# Patient Record
(demographics unavailable — no encounter records)

---

## 2025-06-23 NOTE — HISTORY OF PRESENT ILLNESS
[FreeTextEntry1] : annual wellness visit  [de-identified] : 72-year-old male with insulin dependent T2DM (A1c 9.6 on 02/2024), HLD and HTN presenting for annual visit. Patient reports overall doing well. He has noticed fungus under his finger on his right hand and would like topical anti-fungal cream. When discussing his medications, he has been taking his BP meds 2-3x a week instead of everyday. He takes his glucose daily with sugars ranging from 105-150 every day. He has been taking insulin lantus 10units at night daily. He lives at home with his wife.

## 2025-06-23 NOTE — HEALTH RISK ASSESSMENT
[Little interest or pleasure doing things] : 1) Little interest or pleasure doing things [Feeling down, depressed, or hopeless] : 2) Feeling down, depressed, or hopeless [0] : 2) Feeling down, depressed, or hopeless: Not at all (0) [PHQ-9 Negative - No further assessment needed] : PHQ-9 Negative - No further assessment needed [Former] : Former [No] : No [No falls in past year] : Patient reported no falls in the past year [PHQ-2 Negative - No further assessment needed] : PHQ-2 Negative - No further assessment needed [Fully functional (bathing, dressing, toileting, transferring, walking, feeding)] : Fully functional (bathing, dressing, toileting, transferring, walking, feeding) [[facility]] : 10 [[id number]] : 0

## 2025-06-23 NOTE — HEALTH RISK ASSESSMENT
[Little interest or pleasure doing things] : 1) Little interest or pleasure doing things [Feeling down, depressed, or hopeless] : 2) Feeling down, depressed, or hopeless [0] : 2) Feeling down, depressed, or hopeless: Not at all (0) [PHQ-9 Negative - No further assessment needed] : PHQ-9 Negative - No further assessment needed [Former] : Former [No] : No [No falls in past year] : Patient reported no falls in the past year [PHQ-2 Negative - No further assessment needed] : PHQ-2 Negative - No further assessment needed [Fully functional (bathing, dressing, toileting, transferring, walking, feeding)] : Fully functional (bathing, dressing, toileting, transferring, walking, feeding) [[facility]] : 10 [[id number]] : 0

## 2025-06-23 NOTE — HEALTH RISK ASSESSMENT
[Little interest or pleasure doing things] : 1) Little interest or pleasure doing things [Feeling down, depressed, or hopeless] : 2) Feeling down, depressed, or hopeless [0] : 2) Feeling down, depressed, or hopeless: Not at all (0) [PHQ-9 Negative - No further assessment needed] : PHQ-9 Negative - No further assessment needed [Former] : Former [No] : No [No falls in past year] : Patient reported no falls in the past year [PHQ-2 Negative - No further assessment needed] : PHQ-2 Negative - No further assessment needed [Fully functional (bathing, dressing, toileting, transferring, walking, feeding)] : Fully functional (bathing, dressing, toileting, transferring, walking, feeding) [[facility]] : 10 [[id number]] : 0

## 2025-06-23 NOTE — ASSESSMENT
[Vaccines Reviewed] : Immunizations reviewed today. Please see immunization details in the vaccine log within the immunization flowsheet.  [FreeTextEntry1] : 72-year-old male with insulin dependent T2DM (A1c 9.6 on 02/2024), HLD and HTN presenting for annual visit.

## 2025-06-23 NOTE — HISTORY OF PRESENT ILLNESS
[FreeTextEntry1] : annual wellness visit  [de-identified] : 72-year-old male with insulin dependent T2DM (A1c 9.6 on 02/2024), HLD and HTN presenting for annual visit. Patient reports overall doing well. He has noticed fungus under his finger on his right hand and would like topical anti-fungal cream. When discussing his medications, he has been taking his BP meds 2-3x a week instead of everyday. He takes his glucose daily with sugars ranging from 105-150 every day. He has been taking insulin lantus 10units at night daily. He lives at home with his wife.

## 2025-06-23 NOTE — PHYSICAL EXAM
[___/1] : [unfilled]/1   [___/3] : [unfilled]/3 [___/5] : [unfilled]/5 [___/4] : [unfilled]/4 [___/2] : [unfilled]/2 [___/8] : [unfilled]/8 [No Acute Distress] : no acute distress [Well-Appearing] : well-appearing [Normal Sclera/Conjunctiva] : normal sclera/conjunctiva [No Respiratory Distress] : no respiratory distress  [No Accessory Muscle Use] : no accessory muscle use [Clear to Auscultation] : lungs were clear to auscultation bilaterally [Normal Rate] : normal rate  [Regular Rhythm] : with a regular rhythm [Normal S1, S2] : normal S1 and S2 [No Murmur] : no murmur heard [Normal Affect] : the affect was normal [Alert and Oriented x3] : oriented to person, place, and time [de-identified] : +right hand pointer finger with green-brown discoloration under fingernail; [TextBox_2] : yes [TextBox_4] : bachelor's degree [SlumsTotal] : 27

## 2025-06-23 NOTE — PHYSICAL EXAM
[___/1] : [unfilled]/1   [___/3] : [unfilled]/3 [___/5] : [unfilled]/5 [___/4] : [unfilled]/4 [___/2] : [unfilled]/2 [___/8] : [unfilled]/8 [No Acute Distress] : no acute distress [Well-Appearing] : well-appearing [Normal Sclera/Conjunctiva] : normal sclera/conjunctiva [No Respiratory Distress] : no respiratory distress  [No Accessory Muscle Use] : no accessory muscle use [Clear to Auscultation] : lungs were clear to auscultation bilaterally [Normal Rate] : normal rate  [Regular Rhythm] : with a regular rhythm [Normal S1, S2] : normal S1 and S2 [No Murmur] : no murmur heard [Normal Affect] : the affect was normal [Alert and Oriented x3] : oriented to person, place, and time [de-identified] : +right hand pointer finger with green-brown discoloration under fingernail; [TextBox_2] : yes [TextBox_4] : bachelor's degree [SlumsTotal] : 27

## 2025-06-23 NOTE — PHYSICAL EXAM
[___/1] : [unfilled]/1   [___/3] : [unfilled]/3 [___/5] : [unfilled]/5 [___/4] : [unfilled]/4 [___/2] : [unfilled]/2 [___/8] : [unfilled]/8 [No Acute Distress] : no acute distress [Well-Appearing] : well-appearing [Normal Sclera/Conjunctiva] : normal sclera/conjunctiva [No Respiratory Distress] : no respiratory distress  [No Accessory Muscle Use] : no accessory muscle use [Clear to Auscultation] : lungs were clear to auscultation bilaterally [Normal Rate] : normal rate  [Regular Rhythm] : with a regular rhythm [Normal S1, S2] : normal S1 and S2 [No Murmur] : no murmur heard [Normal Affect] : the affect was normal [Alert and Oriented x3] : oriented to person, place, and time [de-identified] : +right hand pointer finger with green-brown discoloration under fingernail; [TextBox_2] : yes [TextBox_4] : bachelor's degree [SlumsTotal] : 27

## 2025-06-23 NOTE — HISTORY OF PRESENT ILLNESS
[FreeTextEntry1] : annual wellness visit  [de-identified] : 72-year-old male with insulin dependent T2DM (A1c 9.6 on 02/2024), HLD and HTN presenting for annual visit. Patient reports overall doing well. He has noticed fungus under his finger on his right hand and would like topical anti-fungal cream. When discussing his medications, he has been taking his BP meds 2-3x a week instead of everyday. He takes his glucose daily with sugars ranging from 105-150 every day. He has been taking insulin lantus 10units at night daily. He lives at home with his wife.

## 2025-06-23 NOTE — END OF VISIT
[] : Resident [FreeTextEntry3] : #CPE  #HTN- taking meds 2-3 times a week- needs to take regullary daily and come back to see us in a week to reassess  #weight loss   #h/o low b12- recheck  #focal intestinal metaplasia - f/u gi to evaluate now for focal intestinal metaplasia  #poorly controlled diabetes- recheck labs  #lung nodule- over due for ct chest- 1.9cm lesion in jan 2024- get ct chest now  [Time Spent: ___ minutes] : I have spent [unfilled] minutes of time on the encounter which excludes teaching and separately reported services.

## 2025-06-24 NOTE — PLAN
[FreeTextEntry1] : RTC in 1 week to f/u on medications he is taking. patient to bring in all medications he is taking including new prescriptions.

## 2025-06-24 NOTE — END OF VISIT
[] : Resident [FreeTextEntry3] : Patient here for follow-up of hypertension, diabetes and hypercholesterolemia.  Blood pressure is improved and close to goal.  Will continue current medication.  Diabetes is very much out of control with a hemoglobin of 14%.  Patient states his fasting blood sugar is between 100 and 130 each morning on 10 units of glargine insulin at night.  Will therefore not increase glargine but add semaglutide.  Will also prescribe a continuous glucose monitor.  Once he starts Ozempic, of asked him to discontinue the glimepiride.  The combination of insulin and sulfonylurea can lead to hypoglycemia.  In terms of his cholesterol, there is some concern he may not be taking his medications correctly.  We will ask him to return to the office in 1 week with his medications for medication review and education on the use of the continuous glucose monitor.

## 2025-06-24 NOTE — HISTORY OF PRESENT ILLNESS
[FreeTextEntry1] : Follow up on BP [de-identified] : 72-year-old male with insulin dependent T2DM (A1c 9.6 on 02/2024), HLD and HTN presenting for follow-up. Patient reports compliance with medications since last visit on 6/18. He reports taking his BP meds and DM meds daily. He has not gone to the pharmacy to  the new prescriptions sent last visit. When he checks his sugar in the morning he says it ranges from 110-150. He has no acute complaints.

## 2025-06-24 NOTE — HEALTH RISK ASSESSMENT
[Little interest or pleasure doing things] : 1) Little interest or pleasure doing things [Feeling down, depressed, or hopeless] : 2) Feeling down, depressed, or hopeless [0] : 2) Feeling down, depressed, or hopeless: Not at all (0) [PHQ-9 Negative - No further assessment needed] : PHQ-9 Negative - No further assessment needed [Former] : Former [[id number]] : 0

## 2025-06-24 NOTE — PHYSICAL EXAM
[No Acute Distress] : no acute distress [Well-Appearing] : well-appearing [Normal Sclera/Conjunctiva] : normal sclera/conjunctiva [No Respiratory Distress] : no respiratory distress  [No Accessory Muscle Use] : no accessory muscle use [Clear to Auscultation] : lungs were clear to auscultation bilaterally [Normal Rate] : normal rate  [Regular Rhythm] : with a regular rhythm [Normal S1, S2] : normal S1 and S2 [No Murmur] : no murmur heard [Normal Affect] : the affect was normal [Alert and Oriented x3] : oriented to person, place, and time

## 2025-07-09 NOTE — PHYSICAL EXAM
[No Acute Distress] : no acute distress [Well Nourished] : well nourished [Well Developed] : well developed [PERRL] : pupils equal round and reactive to light [EOMI] : extraocular movements intact [Normal Outer Ear/Nose] : the outer ears and nose were normal in appearance [Normal Oropharynx] : the oropharynx was normal [No Lymphadenopathy] : no lymphadenopathy [Supple] : supple [No Respiratory Distress] : no respiratory distress  [No Accessory Muscle Use] : no accessory muscle use [Clear to Auscultation] : lungs were clear to auscultation bilaterally [Normal Rate] : normal rate  [Regular Rhythm] : with a regular rhythm [Normal S1, S2] : normal S1 and S2 [Soft] : abdomen soft [Non Tender] : non-tender [Non-distended] : non-distended [Normal Bowel Sounds] : normal bowel sounds [No Focal Deficits] : no focal deficits [Normal Gait] : normal gait

## 2025-07-13 NOTE — HEALTH RISK ASSESSMENT
[Little interest or pleasure doing things] : 1) Little interest or pleasure doing things [Feeling down, depressed, or hopeless] : 2) Feeling down, depressed, or hopeless [0] : 2) Feeling down, depressed, or hopeless: Not at all (0) [PHQ-9 Negative - No further assessment needed] : PHQ-9 Negative - No further assessment needed [Never] : Never [[id number]] : 0

## 2025-07-13 NOTE — HISTORY OF PRESENT ILLNESS
[FreeTextEntry1] : pt here for follow up [de-identified] : 72-year-old male with insulin dependent T2DM (A1c 14), HTN and HLD; now presenting for follow up. Pt reports he started taking Metformin 1g BID and continues to take Glimepride 4mg qAM and Lantus 10U qhs. Pt was not able to switch Glimepride to Ozempic since Ozempic was not covered by his insurance ($500+). His fasting FSG at home have been ranging between 200-300. Pt reports some lightheadedness in the morning if he does not eat breakfast but has been eating breakfast now and his lightheadedness resolved.

## 2025-07-13 NOTE — END OF VISIT
[] : Resident [FreeTextEntry3] : #unconrtolled htn- needs to cehck home bps at home.  increase lisinopril to 40mg. consider changing to olmesartan and chlorthliadone later potentially also. rehceck BMP next week- but patient is flying out of country this weekend. counseled him not ideal with poorly controlled htn and poorly controlled diabetes and advised against traveling out of state. discussed if traveling hesitatnt to make big changes to meds that require lab evaluation and monitoring for side effects  #focal intestinal metaplasia - f/u gi to evaluate now for focal intestinal metaplasia  #lung nodule- over due for ct chest- 1.9cm lesion in jan 2024- get ct chest now.  #  #poorly controlled diabetes- on glimipiride 4mg because unable to stop because he couldn't get ozempic due to $500 copay- ask nurse to check on coverage for ozempic. increase lantus to 20 units and stop glimipiride. home sugars 200-300s fasting in the morning. . how are morning sugars. reassess in a week about prandial insulin. [Time Spent: ___ minutes] : I have spent [unfilled] minutes of time on the encounter which excludes teaching and separately reported services.

## 2025-07-13 NOTE — HEALTH RISK ASSESSMENT
[Little interest or pleasure doing things] : 1) Little interest or pleasure doing things [Feeling down, depressed, or hopeless] : 2) Feeling down, depressed, or hopeless [0] : 2) Feeling down, depressed, or hopeless: Not at all (0) [PHQ-9 Negative - No further assessment needed] : PHQ-9 Negative - No further assessment needed [Never] : Never [[id number]] : 0

## 2025-07-13 NOTE — REVIEW OF SYSTEMS
[Fever] : no fever [Chills] : no chills [Postnasal Drip] : no postnasal drip [Nasal Discharge] : no nasal discharge [Sore Throat] : no sore throat [Chest Pain] : no chest pain [Palpitations] : no palpitations [Shortness Of Breath] : no shortness of breath [Wheezing] : no wheezing [Cough] : no cough [Abdominal Pain] : no abdominal pain [Nausea] : no nausea [Constipation] : no constipation [Diarrhea] : no diarrhea [Vomiting] : no vomiting [Dysuria] : no dysuria [Hematuria] : no hematuria [Frequency] : no frequency [Headache] : no headache [Fainting] : no fainting

## 2025-07-13 NOTE — HISTORY OF PRESENT ILLNESS
[FreeTextEntry1] : pt here for follow up [de-identified] : 72-year-old male with insulin dependent T2DM (A1c 14), HTN and HLD; now presenting for follow up. Pt reports he started taking Metformin 1g BID and continues to take Glimepride 4mg qAM and Lantus 10U qhs. Pt was not able to switch Glimepride to Ozempic since Ozempic was not covered by his insurance ($500+). His fasting FSG at home have been ranging between 200-300. Pt reports some lightheadedness in the morning if he does not eat breakfast but has been eating breakfast now and his lightheadedness resolved.

## 2025-07-14 NOTE — END OF VISIT
[] : Resident [Time Spent: ___ minutes] : I have spent [unfilled] minutes of time on the encounter which excludes teaching and separately reported services. [FreeTextEntry3] : 72M w/DM, HLD< HTN here for f/u. T2DM - uncontrolled, increase bolus to Lantus 25U qhs and add Lispro 4U TID, advised pt to continue checking FS QID, keep log and return to clinic upon return from trip to [place]. Refer to endo, email [facility] for appt.  Rest of plan as above

## 2025-07-14 NOTE — HISTORY OF PRESENT ILLNESS
[FreeTextEntry1] : pt is here for follow up. [de-identified] : 72-year-old male with insulin dependent T2DM (A1c 14% 06/2025), HLD and HTN presenting for follow-up (last annual visit 06-), last seen on July 9th for diabetes and hypertension, asked at that visit to RTC today 7/11 with his FSG log to see if he should be started on Lispro insulin since he will travelling to [place] on 07- for 1 month.  #Insulin-dependent T2DM (A1c 14), taking Metformin 1g BID and Lantus 20U qhs > increased from 10u qhs on July 9th (2 days ago). Glimeperide was d/c last visit, patient has not been taking. Pt was not able to switch Glimepride to a GLP-1 (ozempic), but mounjaro has been approved for 50% copay by the patient of $250, i discussed this with him and he said its still expensive but he would think about it. His fasting FSG at home have been ranging  between 200-300. Since changing to 20 units Lantus, He reports that his  this AM -> 212 yesterday AM, 250 yesterday afternoon, 330 yesterday night. When reviewing his meals, he said he felt lightheaded in the afternoon, did not check his sugars, but still drank orange juice before his evening fingerstick.  I discussed his diagnosis of diabetes with him with teachback methods- he was able to describe what diabetes is and that it can "impact the nerves". I also went through other end-organ signs with him, including eye disease and kidney disease, reviewing his recent urine ACR and what this means for early signs of microalbuminuria. He said that his last eye exam was 1.5 yrs ago and he would like to see optho again as his near sighted vision has reduced over the past few months.  #BP - on previous chart note, patient reports that he was taking his BP meds 2-3x a week instead of everyday. He now takes all three daily in the AM - amlodipine 10mg, lisinopril 20mg, and HCTZ 12.5mg.   No other issues at present.  #HHA form - has medicaid A and B, interested in HHA services.

## 2025-07-14 NOTE — HISTORY OF PRESENT ILLNESS
[FreeTextEntry1] : pt is here for follow up. [de-identified] : 72-year-old male with insulin dependent T2DM (A1c 14% 06/2025), HLD and HTN presenting for follow-up (last annual visit 06-), last seen on July 9th for diabetes and hypertension, asked at that visit to RTC today 7/11 with his FSG log to see if he should be started on Lispro insulin since he will travelling to [place] on 07- for 1 month.  #Insulin-dependent T2DM (A1c 14), taking Metformin 1g BID and Lantus 20U qhs > increased from 10u qhs on July 9th (2 days ago). Glimeperide was d/c last visit, patient has not been taking. Pt was not able to switch Glimepride to a GLP-1 (ozempic), but mounjaro has been approved for 50% copay by the patient of $250, i discussed this with him and he said its still expensive but he would think about it. His fasting FSG at home have been ranging  between 200-300. Since changing to 20 units Lantus, He reports that his  this AM -> 212 yesterday AM, 250 yesterday afternoon, 330 yesterday night. When reviewing his meals, he said he felt lightheaded in the afternoon, did not check his sugars, but still drank orange juice before his evening fingerstick.  I discussed his diagnosis of diabetes with him with teachback methods- he was able to describe what diabetes is and that it can "impact the nerves". I also went through other end-organ signs with him, including eye disease and kidney disease, reviewing his recent urine ACR and what this means for early signs of microalbuminuria. He said that his last eye exam was 1.5 yrs ago and he would like to see optho again as his near sighted vision has reduced over the past few months.  #BP - on previous chart note, patient reports that he was taking his BP meds 2-3x a week instead of everyday. He now takes all three daily in the AM - amlodipine 10mg, lisinopril 20mg, and HCTZ 12.5mg.   No other issues at present.  #HHA form - has medicaid A and B, interested in HHA services.

## 2025-07-14 NOTE — HISTORY OF PRESENT ILLNESS
[FreeTextEntry1] : pt is here for follow up. [de-identified] : 72-year-old male with insulin dependent T2DM (A1c 14% 06/2025), HLD and HTN presenting for follow-up (last annual visit 06-), last seen on July 9th for diabetes and hypertension, asked at that visit to RTC today 7/11 with his FSG log to see if he should be started on Lispro insulin since he will travelling to [place] on 07- for 1 month.  #Insulin-dependent T2DM (A1c 14), taking Metformin 1g BID and Lantus 20U qhs > increased from 10u qhs on July 9th (2 days ago). Glimeperide was d/c last visit, patient has not been taking. Pt was not able to switch Glimepride to a GLP-1 (ozempic), but mounjaro has been approved for 50% copay by the patient of $250, i discussed this with him and he said its still expensive but he would think about it. His fasting FSG at home have been ranging  between 200-300. Since changing to 20 units Lantus, He reports that his  this AM -> 212 yesterday AM, 250 yesterday afternoon, 330 yesterday night. When reviewing his meals, he said he felt lightheaded in the afternoon, did not check his sugars, but still drank orange juice before his evening fingerstick.  I discussed his diagnosis of diabetes with him with teachback methods- he was able to describe what diabetes is and that it can "impact the nerves". I also went through other end-organ signs with him, including eye disease and kidney disease, reviewing his recent urine ACR and what this means for early signs of microalbuminuria. He said that his last eye exam was 1.5 yrs ago and he would like to see optho again as his near sighted vision has reduced over the past few months.  #BP - on previous chart note, patient reports that he was taking his BP meds 2-3x a week instead of everyday. He now takes all three daily in the AM - amlodipine 10mg, lisinopril 20mg, and HCTZ 12.5mg.   No other issues at present.  #HHA form - has medicaid A and B, interested in HHA services.

## 2025-07-14 NOTE — HISTORY OF PRESENT ILLNESS
[FreeTextEntry1] : pt is here for follow up. [de-identified] : 72-year-old male with insulin dependent T2DM (A1c 14% 06/2025), HLD and HTN presenting for follow-up (last annual visit 06-), last seen on July 9th for diabetes and hypertension, asked at that visit to RTC today 7/11 with his FSG log to see if he should be started on Lispro insulin since he will travelling to [place] on 07- for 1 month.  #Insulin-dependent T2DM (A1c 14), taking Metformin 1g BID and Lantus 20U qhs > increased from 10u qhs on July 9th (2 days ago). Glimeperide was d/c last visit, patient has not been taking. Pt was not able to switch Glimepride to a GLP-1 (ozempic), but mounjaro has been approved for 50% copay by the patient of $250, i discussed this with him and he said its still expensive but he would think about it. His fasting FSG at home have been ranging  between 200-300. Since changing to 20 units Lantus, He reports that his  this AM -> 212 yesterday AM, 250 yesterday afternoon, 330 yesterday night. When reviewing his meals, he said he felt lightheaded in the afternoon, did not check his sugars, but still drank orange juice before his evening fingerstick.  I discussed his diagnosis of diabetes with him with teachback methods- he was able to describe what diabetes is and that it can "impact the nerves". I also went through other end-organ signs with him, including eye disease and kidney disease, reviewing his recent urine ACR and what this means for early signs of microalbuminuria. He said that his last eye exam was 1.5 yrs ago and he would like to see optho again as his near sighted vision has reduced over the past few months.  #BP - on previous chart note, patient reports that he was taking his BP meds 2-3x a week instead of everyday. He now takes all three daily in the AM - amlodipine 10mg, lisinopril 20mg, and HCTZ 12.5mg.   No other issues at present.  #HHA form - has medicaid A and B, interested in HHA services.

## 2025-07-14 NOTE — END OF VISIT
[] : Resident [Time Spent: ___ minutes] : I have spent [unfilled] minutes of time on the encounter which excludes teaching and separately reported services. [FreeTextEntry3] : 72M w/DM, HLD< HTN here for f/u. T2DM - uncontrolled, increase bolus to Lantus 25U qhs and add Lispro 4U TID, advised pt to continue checking FS QID, keep log and return to clinic upon return from trip to [place]. Refer to endo, email [facility] for appt.  Rest of plan as above

## 2025-07-14 NOTE — END OF VISIT
[] : Resident [Time Spent: ___ minutes] : I have spent [unfilled] minutes of time on the encounter which excludes teaching and separately reported services. [FreeTextEntry3] : 72M w/DM, HLD< HTN here for f/u. T2DM - uncontrolled, increase bolus to Lantus 25U qhs and add Lispro 4U TID, advised pt to continue checking FS QID, keep log and return to clinic upon return from trip to [place]. Refer to endo, email [facility] for appt.  Rest of plan as above

## 2025-07-14 NOTE — END OF VISIT
[] : Resident [Time Spent: ___ minutes] : I have spent [unfilled] minutes of time on the encounter which excludes teaching and separately reported services. [FreeTextEntry3] : 72M w/DM, HLD< HTN here for f/u. T2DM - uncontrolled, increase bolus to Lantus 25U qhs and add Lispro 4U TID, advised pt to continue checking FS QID, keep log and return to clinic upon return from trip to [place]. Refer to endo, email [facility] for appt.  Rest of plan as above